# Patient Record
Sex: FEMALE | Race: BLACK OR AFRICAN AMERICAN | URBAN - METROPOLITAN AREA
[De-identification: names, ages, dates, MRNs, and addresses within clinical notes are randomized per-mention and may not be internally consistent; named-entity substitution may affect disease eponyms.]

---
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
## 2023-03-10 ENCOUNTER — HOSPITAL ENCOUNTER (EMERGENCY)
Age: 23
Discharge: HOME | End: 2023-03-10
Attending: EMERGENCY MEDICINE | Admitting: EMERGENCY MEDICINE
Payer: MEDICAID

## 2023-03-10 VITALS
BODY MASS INDEX: 40.48 KG/M2 | DIASTOLIC BLOOD PRESSURE: 74 MMHG | OXYGEN SATURATION: 99 % | RESPIRATION RATE: 18 BRPM | HEART RATE: 87 BPM | TEMPERATURE: 99.4 F | SYSTOLIC BLOOD PRESSURE: 124 MMHG | WEIGHT: 220 LBS | HEIGHT: 62 IN

## 2023-03-10 DIAGNOSIS — O20.9 VAGINAL BLEEDING IN PREGNANCY, FIRST TRIMESTER: Primary | ICD-10-CM

## 2023-03-10 LAB
APPEARANCE UR: ABNORMAL
BACTERIA URNS QL MICRO: ABNORMAL /HPF
BILIRUB UR QL: NEGATIVE
COLOR UR: YELLOW
EPITH CASTS URNS QL MICRO: ABNORMAL /LPF
GLUCOSE UR STRIP.AUTO-MCNC: NEGATIVE MG/DL
HCG UR QL: POSITIVE
HGB UR QL STRIP: ABNORMAL
KETONES UR QL STRIP.AUTO: NEGATIVE MG/DL
LEUKOCYTE ESTERASE UR QL STRIP.AUTO: ABNORMAL
NITRITE UR QL STRIP.AUTO: NEGATIVE
PH UR STRIP: 6 (ref 5–8)
PROT UR STRIP-MCNC: 30 MG/DL
RBC #/AREA URNS HPF: ABNORMAL /HPF (ref 0–5)
SP GR UR REFRACTOMETRY: 1.02 (ref 1–1.03)
UROBILINOGEN UR QL STRIP.AUTO: 1 EU/DL (ref 0.2–1)
WBC URNS QL MICRO: ABNORMAL /HPF (ref 0–4)

## 2023-03-10 PROCEDURE — 99283 EMERGENCY DEPT VISIT LOW MDM: CPT

## 2023-03-10 PROCEDURE — 81001 URINALYSIS AUTO W/SCOPE: CPT

## 2023-03-10 PROCEDURE — 81025 URINE PREGNANCY TEST: CPT

## 2023-03-11 ENCOUNTER — APPOINTMENT (OUTPATIENT)
Dept: ULTRASOUND IMAGING | Age: 23
End: 2023-03-11
Attending: NURSE PRACTITIONER
Payer: MEDICAID

## 2023-03-11 ENCOUNTER — HOSPITAL ENCOUNTER (EMERGENCY)
Age: 23
Discharge: HOME | End: 2023-03-11
Payer: MEDICAID

## 2023-03-11 VITALS
OXYGEN SATURATION: 98 % | BODY MASS INDEX: 40.48 KG/M2 | SYSTOLIC BLOOD PRESSURE: 121 MMHG | RESPIRATION RATE: 19 BRPM | HEART RATE: 72 BPM | DIASTOLIC BLOOD PRESSURE: 66 MMHG | WEIGHT: 220 LBS | HEIGHT: 62 IN | TEMPERATURE: 98.8 F

## 2023-03-11 DIAGNOSIS — O20.9 VAGINAL BLEEDING IN PREGNANCY, FIRST TRIMESTER: Primary | ICD-10-CM

## 2023-03-11 DIAGNOSIS — O20.0 THREATENED ABORTION: ICD-10-CM

## 2023-03-11 LAB
ABO + RH BLD: NORMAL
ANION GAP SERPL CALC-SCNC: 4 MMOL/L (ref 5–15)
BASOPHILS # BLD: 0.1 K/UL (ref 0–0.1)
BASOPHILS NFR BLD: 1 % (ref 0–1)
BLOOD GROUP ANTIBODIES SERPL: NEGATIVE
BUN SERPL-MCNC: 8 MG/DL (ref 6–20)
BUN/CREAT SERPL: 12 (ref 12–20)
CA-I BLD-MCNC: 9.4 MG/DL (ref 8.5–10.1)
CHLORIDE SERPL-SCNC: 110 MMOL/L (ref 97–108)
CO2 SERPL-SCNC: 23 MMOL/L (ref 21–32)
CREAT SERPL-MCNC: 0.69 MG/DL (ref 0.55–1.02)
DIFFERENTIAL METHOD BLD: ABNORMAL
EOSINOPHIL # BLD: 0.1 K/UL (ref 0–0.4)
EOSINOPHIL NFR BLD: 2 % (ref 0–7)
ERYTHROCYTE [DISTWIDTH] IN BLOOD BY AUTOMATED COUNT: 12.3 % (ref 11.5–14.5)
GLUCOSE SERPL-MCNC: 98 MG/DL (ref 65–100)
HCG SERPL-ACNC: 33 MIU/ML (ref 0–6)
HCT VFR BLD AUTO: 34.3 % (ref 35–47)
HGB BLD-MCNC: 11.8 G/DL (ref 11.5–16)
IMM GRANULOCYTES # BLD AUTO: 0 K/UL (ref 0–0.04)
IMM GRANULOCYTES NFR BLD AUTO: 0 % (ref 0–0.5)
LYMPHOCYTES # BLD: 1.7 K/UL (ref 0.8–3.5)
LYMPHOCYTES NFR BLD: 36 % (ref 12–49)
MCH RBC QN AUTO: 30.6 PG (ref 26–34)
MCHC RBC AUTO-ENTMCNC: 34.4 G/DL (ref 30–36.5)
MCV RBC AUTO: 88.9 FL (ref 80–99)
MONOCYTES # BLD: 0.3 K/UL (ref 0–1)
MONOCYTES NFR BLD: 7 % (ref 5–13)
NEUTS SEG # BLD: 2.5 K/UL (ref 1.8–8)
NEUTS SEG NFR BLD: 54 % (ref 32–75)
NRBC # BLD: 0 K/UL (ref 0–0.01)
NRBC BLD-RTO: 0 PER 100 WBC
PLATELET # BLD AUTO: 409 K/UL (ref 150–400)
PMV BLD AUTO: 9.3 FL (ref 8.9–12.9)
POTASSIUM SERPL-SCNC: 3.8 MMOL/L (ref 3.5–5.1)
RBC # BLD AUTO: 3.86 M/UL (ref 3.8–5.2)
SODIUM SERPL-SCNC: 137 MMOL/L (ref 136–145)
SPECIMEN EXP DATE BLD: NORMAL
WBC # BLD AUTO: 4.7 K/UL (ref 3.6–11)

## 2023-03-11 PROCEDURE — 84702 CHORIONIC GONADOTROPIN TEST: CPT

## 2023-03-11 PROCEDURE — 85025 COMPLETE CBC W/AUTO DIFF WBC: CPT

## 2023-03-11 PROCEDURE — 86900 BLOOD TYPING SEROLOGIC ABO: CPT

## 2023-03-11 PROCEDURE — 80048 BASIC METABOLIC PNL TOTAL CA: CPT

## 2023-03-11 PROCEDURE — 99284 EMERGENCY DEPT VISIT MOD MDM: CPT

## 2023-03-11 PROCEDURE — 76801 OB US < 14 WKS SINGLE FETUS: CPT

## 2023-03-11 PROCEDURE — 76817 TRANSVAGINAL US OBSTETRIC: CPT

## 2023-03-11 PROCEDURE — 36415 COLL VENOUS BLD VENIPUNCTURE: CPT

## 2023-03-11 NOTE — DISCHARGE INSTRUCTIONS
.. Thank you! Thank you for allowing me to care for you in the emergency department. It is my goal to provide you with excellent care. If you have not received excellent quality care, please ask to speak to the nurse manager. Please fill out the survey that will come to you by mail or email since we listen to your feedback! Below you will find a list of your tests from today's visit. Should you have any questions, please do not hesitate to call the emergency department. Labs  Recent Results (from the past 12 hour(s))   BETA HCG, QT    Collection Time: 03/11/23 11:16 AM   Result Value Ref Range    Beta HCG, QT 33 (H) 0 - 6 mIU/mL   CBC WITH AUTOMATED DIFF    Collection Time: 03/11/23 11:16 AM   Result Value Ref Range    WBC 4.7 3.6 - 11.0 K/uL    RBC 3.86 3.80 - 5.20 M/uL    HGB 11.8 11.5 - 16.0 g/dL    HCT 34.3 (L) 35.0 - 47.0 %    MCV 88.9 80.0 - 99.0 FL    MCH 30.6 26.0 - 34.0 PG    MCHC 34.4 30.0 - 36.5 g/dL    RDW 12.3 11.5 - 14.5 %    PLATELET 279 (H) 061 - 400 K/uL    MPV 9.3 8.9 - 12.9 FL    NRBC 0.0 0.0  WBC    ABSOLUTE NRBC 0.00 0.00 - 0.01 K/uL    NEUTROPHILS 54 32 - 75 %    LYMPHOCYTES 36 12 - 49 %    MONOCYTES 7 5 - 13 %    EOSINOPHILS 2 0 - 7 %    BASOPHILS 1 0 - 1 %    IMMATURE GRANULOCYTES 0 0 - 0.5 %    ABS. NEUTROPHILS 2.5 1.8 - 8.0 K/UL    ABS. LYMPHOCYTES 1.7 0.8 - 3.5 K/UL    ABS. MONOCYTES 0.3 0.0 - 1.0 K/UL    ABS. EOSINOPHILS 0.1 0.0 - 0.4 K/UL    ABS. BASOPHILS 0.1 0.0 - 0.1 K/UL    ABS. IMM.  GRANS. 0.0 0.00 - 0.04 K/UL    DF AUTOMATED     METABOLIC PANEL, BASIC    Collection Time: 03/11/23 11:16 AM   Result Value Ref Range    Sodium 137 136 - 145 mmol/L    Potassium 3.8 3.5 - 5.1 mmol/L    Chloride 110 (H) 97 - 108 mmol/L    CO2 23 21 - 32 mmol/L    Anion gap 4 (L) 5 - 15 mmol/L    Glucose 98 65 - 100 mg/dL    BUN 8 6 - 20 mg/dL    Creatinine 0.69 0.55 - 1.02 mg/dL    BUN/Creatinine ratio 12 12 - 20      eGFR >60 >60 ml/min/1.73m2    Calcium 9.4 8.5 - 10.1 mg/dL   TYPE & SCREEN    Collection Time: 03/11/23 11:16 AM   Result Value Ref Range    Crossmatch Expiration 03/14/2023,2356     ABO/Rh(D) A Positive     Antibody screen Negative        Radiologic Studies  US PREG UTS < 14 WKS SNGL   Final Result   No acute process. No intrauterine pregnancy shown. US UTS TRANSVAGINAL OB   Final Result   No acute process. No intrauterine pregnancy shown. CT Results  (Last 48 hours)      None          CXR Results  (Last 48 hours)      None          ------------------------------------------------------------------------------------------------------------  The exam and treatment you received in the Emergency Department were for an urgent problem and are not intended as complete care. It is important that you follow-up with a doctor, nurse practitioner, or physician assistant to:  (1) confirm your diagnosis,  (2) re-evaluation of changes in your illness and treatment, and  (3) for ongoing care. Please take your discharge instructions with you when you go to your follow-up appointment. If you have any problem arranging a follow-up appointment, contact the Emergency Department. If your symptoms become worse or you do not improve as expected and you are unable to reach your health care provider, please return to the Emergency Department. We are available 24 hours a day. If a prescription has been provided, please have it filled as soon as possible to prevent a delay in treatment. If you have any questions or reservations about taking the medication due to side effects or interactions with other medications, please call your primary care provider or contact the ER.

## 2023-03-11 NOTE — ED NOTES
Pt discharged home with self. Pt acting appropriately, respirations regular and unlabored, cap refill less than two seconds. Skin pink, dry and warm. Lungs clear bilaterally. No further complaints at this time. Patient verbalized understanding of discharge paperwork and has no further questions at this time. Education provided about continuation of care, follow up care and medication administration. Patient able to provided teach back about discharge instructions.

## 2023-03-11 NOTE — ED TRIAGE NOTES
Pt reports onset of vaginal bleeding x 2 days, seen for the same 1 day ago with positive urine HCG.  Pt denies abdominal pain or large amount of bleeding, pt denies clots

## 2023-03-11 NOTE — ED PROVIDER NOTES
[facility] EMERGENCY DEPT  EMERGENCY DEPARTMENT HISTORY AND PHYSICAL EXAM      Date: 3/11/2023  Patient Name: Roxy Sierra  MRN: [id number]  [place]: 2000  Date of evaluation: 3/11/2023  Provider: Monica Christian NP   Note Started: 11:10 AM 3/11/23    HISTORY OF PRESENT ILLNESS     Chief Complaint   Patient presents with    Vaginal Bleeding    Pregnancy Problem       History Provided By: Patient    HPI: Roxy Sierra is a 23 y.o. female presented to the ED complaining of vaginal bleeding for the past 2 days. Patient states her last menstrual period was 02/22/23. She denies cramping,abdominal pain, nausea, vomiting, or heavy bleeding. Patient states she notices the bleeding when she wipes after going to the bathroom. She has had 2 uncomplicated pregnancies prior. PAST MEDICAL HISTORY   Past Medical History:  No past medical history on file. Past Surgical History:  No past surgical history on file. Family History:  No family history on file. Social History:  Social History     Tobacco Use    Smoking status: Never    Smokeless tobacco: Never   Substance Use Topics    Drug use: Never       Allergies:  No Known Allergies    PCP: None    Current Meds:   There are no discharge medications for this patient. PHYSICAL EXAM     ED Triage Vitals   ED Encounter Vitals Group      BP 03/11/23 1049 121/66      Pulse (Heart Rate) 03/11/23 1049 72      Resp Rate 03/11/23 1049 19      Temp 03/11/23 1049 98.8 °F (37.1 °C)      Temp src --       O2 Sat (%) 03/11/23 1049 98 %      Weight 03/11/23 1050 220 lb      Height 03/11/23 1050 5' 2\"      Physical Exam  Constitutional:       General: She is not in acute distress. Appearance: Normal appearance. She is normal weight. She is not ill-appearing or diaphoretic. Cardiovascular:      Rate and Rhythm: Normal rate and regular rhythm. Pulses: Normal pulses. Heart sounds: Normal heart sounds.    Pulmonary:      Effort: Pulmonary effort is normal.      Breath sounds: Normal breath sounds. Abdominal:      Palpations: Abdomen is soft. Tenderness: There is no abdominal tenderness. There is no guarding or rebound. Genitourinary:     Comments: Vaginal bleeding  Skin:     General: Skin is warm and dry. Neurological:      Mental Status: She is alert. SCREENINGS        No data recorded      LAB, EKG AND DIAGNOSTIC RESULTS   Labs:  Recent Results (from the past 12 hour(s))   BETA HCG, QT    Collection Time: 03/11/23 11:16 AM   Result Value Ref Range    Beta HCG, QT 33 (H) 0 - 6 mIU/mL   CBC WITH AUTOMATED DIFF    Collection Time: 03/11/23 11:16 AM   Result Value Ref Range    WBC 4.7 3.6 - 11.0 K/uL    RBC 3.86 3.80 - 5.20 M/uL    HGB 11.8 11.5 - 16.0 g/dL    HCT 34.3 (L) 35.0 - 47.0 %    MCV 88.9 80.0 - 99.0 FL    MCH 30.6 26.0 - 34.0 PG    MCHC 34.4 30.0 - 36.5 g/dL    RDW 12.3 11.5 - 14.5 %    PLATELET 897 (H) 958 - 400 K/uL    MPV 9.3 8.9 - 12.9 FL    NRBC 0.0 0.0  WBC    ABSOLUTE NRBC 0.00 0.00 - 0.01 K/uL    NEUTROPHILS 54 32 - 75 %    LYMPHOCYTES 36 12 - 49 %    MONOCYTES 7 5 - 13 %    EOSINOPHILS 2 0 - 7 %    BASOPHILS 1 0 - 1 %    IMMATURE GRANULOCYTES 0 0 - 0.5 %    ABS. NEUTROPHILS 2.5 1.8 - 8.0 K/UL    ABS. LYMPHOCYTES 1.7 0.8 - 3.5 K/UL    ABS. MONOCYTES 0.3 0.0 - 1.0 K/UL    ABS. EOSINOPHILS 0.1 0.0 - 0.4 K/UL    ABS. BASOPHILS 0.1 0.0 - 0.1 K/UL    ABS. IMM.  GRANS. 0.0 0.00 - 0.04 K/UL    DF AUTOMATED     METABOLIC PANEL, BASIC    Collection Time: 03/11/23 11:16 AM   Result Value Ref Range    Sodium 137 136 - 145 mmol/L    Potassium 3.8 3.5 - 5.1 mmol/L    Chloride 110 (H) 97 - 108 mmol/L    CO2 23 21 - 32 mmol/L    Anion gap 4 (L) 5 - 15 mmol/L    Glucose 98 65 - 100 mg/dL    BUN 8 6 - 20 mg/dL    Creatinine 0.69 0.55 - 1.02 mg/dL    BUN/Creatinine ratio 12 12 - 20      eGFR >60 >60 ml/min/1.73m2    Calcium 9.4 8.5 - 10.1 mg/dL   TYPE & SCREEN    Collection Time: 03/11/23 11:16 AM   Result Value Ref Range    Crossmatch Expiration 03/14/2023,2359     ABO/Rh(D) A Positive     Antibody screen Negative        EKG:  Not Applicable    Radiologic Studies:   Not applicable    Interpretation per the Radiologist below, if available at the time of this note:  US UTS TRANSVAGINAL OB    Result Date: 3/11/2023  TRANSABDOMINAL AND TRANSVAGINAL PELVIC ULTRASOUND WITH PELVIC DOPPLER. 3/11/2023 2:50 PM INDICATION: Bleeding, pregnancy. COMPARISON: None. TECHNIQUE: Grayscale, color, and Doppler ultrasound imaging of the pelvis was performed both transabdominally and transvaginally. FINDINGS: Transabdominally, the uterus measures 86 x 40 x 61 mm and shows no focal myometrial abnormality. The endometrium is obscured by the angle of the uterus. The right ovary measures 30 x 18 x 13 mm and the left ovary measures 30 x 13 x 23 mm. Transvaginally, the uterus measures 86 x 47 x 46 mm and shows no focal myometrial abnormality. The endometrium measures 4 mm and no intrauterine pregnancy is shown. The ovaries are normal in size and echotexture. The bilateral normal Doppler flow does not exclude torsion, however, the likelihood is very low given the greyscale appearance of the ovaries. The right ovary measures 28 x 15 x 17 mm and the left ovary measures 26 x 18 x 18 mm. There is trace free pelvic fluid. No acute process. No intrauterine pregnancy shown. US PREG UTS < 14 WKS SNGL    Result Date: 3/11/2023  TRANSABDOMINAL AND TRANSVAGINAL PELVIC ULTRASOUND WITH PELVIC DOPPLER. 3/11/2023 2:50 PM INDICATION: Bleeding, pregnancy. COMPARISON: None. TECHNIQUE: Grayscale, color, and Doppler ultrasound imaging of the pelvis was performed both transabdominally and transvaginally. FINDINGS: Transabdominally, the uterus measures 86 x 40 x 61 mm and shows no focal myometrial abnormality. The endometrium is obscured by the angle of the uterus. The right ovary measures 30 x 18 x 13 mm and the left ovary measures 30 x 13 x 23 mm.  Transvaginally, the uterus measures 86 x 47 x 46 mm and shows no focal myometrial abnormality. The endometrium measures 4 mm and no intrauterine pregnancy is shown. The ovaries are normal in size and echotexture. The bilateral normal Doppler flow does not exclude torsion, however, the likelihood is very low given the greyscale appearance of the ovaries. The right ovary measures 28 x 15 x 17 mm and the left ovary measures 26 x 18 x 18 mm. There is trace free pelvic fluid. No acute process. No intrauterine pregnancy shown. PROCEDURES   Unless otherwise noted below, none. Procedures      CRITICAL CARE TIME   None    ED COURSE and DIFFERENTIAL DIAGNOSIS/MDM   CC/HPI Summary, DDx, ED Course, and Reassessment:   Pt presents with vaginal bleeding during pregnancy. DDx; ectopic, molar pregnancy, physiologic bleeding, threatened . Will get beta hcg, T+S for Rh status, and Transvaginal US to further evaluate. Pt has been re-examined and states that they are feeling better and have no new complaints. Laboratory tests, medications, x-rays, diagnosis, follow up plan and return instructions have been reviewed and discussed with the patient and/or family. Pt and/or family have had the opportunity to ask questions about their care. Patient and/or family express understanding and agreement with care plan, follow up and return instructions. Patent and/or family agree to call an OB/GYN as soon as possible for a repeat HCG test in 48 hours. Patient and family understand that they could still have a miscarriage even with POC seen in the uterus and that a heterotopic pregnancy is still a very small possibility. The patient and family understand that OB/GYN follow up is necessary to evaluate these conditions.       Records Reviewed (source and summary of external notes): Prior medical records and Nursing notes    Vitals:    Vitals:    23 1049 23 1050   BP: 121/66    Pulse: 72    Resp: 19    Temp: 98.8 °F (37.1 °C)    SpO2: 98%    Weight:  99.8 kg (220 lb)   Height:  5' 2\" (1.575 m)             Patient was given the following medications:  Medications - No data to display    CONSULTS: (Who and What was discussed)  None     Social Determinants affecting Dx or Tx: None    FINAL IMPRESSION     1. Vaginal bleeding in pregnancy, first trimester    2. Threatened           DISPOSITION/PLAN   Discharged    Discharge Note: The patient is stable for discharge home. The signs, symptoms, diagnosis, and discharge instructions have been discussed, understanding conveyed, and agreed upon. The patient is to follow up as recommended or return to ER should their symptoms worsen. PATIENT REFERRED TO:  Follow-up Information       Follow up With Specialties Details Why [address] EMERGENCY DEPT Emergency Medicine  If symptoms worsen [facility] [address]  [phone]              DISCHARGE MEDICATIONS:  There are no discharge medications for this patient. DISCONTINUED MEDICATIONS:  There are no discharge medications for this patient. I am the Primary Clinician of Record: Che Gutierrez NP (electronically signed)    (Please note that parts of this dictation were completed with voice recognition software. Quite often unanticipated grammatical, syntax, homophones, and other interpretive errors are inadvertently transcribed by the computer software. Please disregards these errors.  Please excuse any errors that have escaped final proofreading.)

## 2023-03-11 NOTE — ED PROVIDER NOTES
EMERGENCY DEPARTMENT HISTORY AND PHYSICAL EXAM      Date: 3/10/2023  Patient Name: Nathan Pagan    History of Presenting Illness     Chief Complaint   Patient presents with    Threatened Miscarriage       History Provided By: Patient    HPI: Nathan Pagan, 23 y.o. female   presents to the ED with cc of vaginal bleeding. Patient complains of seeing tinge of blood when she wiped after urination just prior to arrival.  Patient states that she was tested positive for pregnancy at home. Last menstrual period was February 22. No passing blood clots. No dysuria or hematuria. No abdominal pain or flank pain. No nausea vomiting or diarrhea. No signs of GI bleeding. Patient is G2, P2.      PCP: None    No current facility-administered medications on file prior to encounter. No current outpatient medications on file prior to encounter. Past History     Past Medical History:  No past medical history on file. Past Surgical History:  No past surgical history on file. Family History:  No family history on file. Social History:  Social History     Tobacco Use    Smoking status: Never    Smokeless tobacco: Never   Substance Use Topics    Drug use: Never       Allergies:  No Known Allergies      Review of Systems       Physical Exam   Physical Exam  Vitals and nursing note reviewed. Constitutional:       General: She is not in acute distress. Appearance: Normal appearance. She is well-developed. She is not ill-appearing or toxic-appearing. HENT:      Head: Normocephalic and atraumatic. Nose: No congestion. Mouth/Throat:      Mouth: Mucous membranes are moist.   Eyes:      Conjunctiva/sclera: Conjunctivae normal.   Cardiovascular:      Rate and Rhythm: Regular rhythm. Heart sounds: Normal heart sounds. Pulmonary:      Effort: Pulmonary effort is normal.      Breath sounds: Normal breath sounds. Abdominal:      General: Bowel sounds are normal.      Palpations: Abdomen is soft. Tenderness: There is no guarding or rebound. Genitourinary:     Comments: No active vaginal bleeding as per patient. Musculoskeletal:         General: No deformity. Cervical back: Neck supple. Skin:     General: Skin is warm and dry. Neurological:      General: No focal deficit present. Mental Status: She is alert. Psychiatric:         Mood and Affect: Mood normal.       Diagnostic Study Results     Labs -     Recent Results (from the past 12 hour(s))   URINALYSIS W/ RFLX MICROSCOPIC    Collection Time: 03/10/23  7:30 PM   Result Value Ref Range    Color Yellow      Appearance Hazy (A) Clear      Specific gravity 1.020 1.003 - 1.030      pH (UA) 6.0 5.0 - 8.0      Protein 30 (A) Negative mg/dL    Glucose Negative Negative mg/dL    Ketone Negative Negative mg/dL    Bilirubin Negative Negative      Blood Large (A) Negative      Urobilinogen 1.0 0.2 - 1.0 EU/dL    Nitrites Negative Negative      Leukocyte Esterase Small (A) Negative     URINE MICROSCOPIC    Collection Time: 03/10/23  7:30 PM   Result Value Ref Range    WBC 5-10 0 - 4 /hpf    RBC 5-10 0 - 5 /hpf    Epithelial cells Few Few /lpf    Bacteria 1+ (A) Negative /hpf   POC HCG,URINE    Collection Time: 03/10/23  7:37 PM   Result Value Ref Range    Pregnancy test,urine (POC) Positive (A) Negative         Radiologic Studies -   No orders to display     CT Results  (Last 48 hours)      None          CXR Results  (Last 48 hours)      None              Medical Decision Making   I am the first provider for this patient. I reviewed the vital signs, available nursing notes, past medical history, past surgical history, family history and social history. Vital Signs-Reviewed the patient's vital signs.   Patient Vitals for the past 12 hrs:   Temp Pulse Resp BP SpO2   03/10/23 1901 99.4 °F (37.4 °C) 87 18 124/74 99 %       Records Reviewed:     Provider Notes (Medical Decision Making):   Patient was G2, P2 with a last menstrual period about 3 weeks ago present with seeing tinge of blood on wipes after urination just prior to arrival.  No abdominal pain. Patient is nontoxic-appearing in no acute distress. Ectopic pregnancy very unlikely. Vaginal spotting may represent normal part of early pregnancy versus threatened miscarriage. I discussed with the patient regarding the implication of the episode of bleeding. Due to lack of severe bleeding and 3 weeks gestation based on LMP, emergent ultrasound would not be helpful. Patient was discharged with instruction to follow-up with OB as planned in 3 weeks and return to emergency room for abdominal pain or worsening vaginal bleeding. ED Course:   Initial assessment performed. The patients presenting problems have been discussed, and they are in agreement with the care plan formulated and outlined with them. I have encouraged them to ask questions as they arise throughout their visit. No active bleeding. PROCEDURES      Disposition: Condition stable   DC- Adult Discharges: All of the diagnostic tests were reviewed and questions answered. Diagnosis, care plan and treatment options were discussed. understand instructions and will follow up as directed. The patients results have been reviewed with them. They have been counseled regarding their diagnosis. The patient verbally convey understanding and agreement of the signs, symptoms, diagnosis, treatment and prognosis and additionally agrees to follow up as recommended. They also agree with the care-plan and convey that all of their questions have been answered. I have also put together some discharge instructions for them that include: 1) educational information regarding their diagnosis, 2) how to care for their diagnosis at home, as well a 3) list of reasons why they would want to return to the ED prior to their follow-up appointment, should their condition change. PLAN:  1. There are no discharge medications for this patient.     2. Follow-up Information       Follow up With Specialties Details Why Contact Info    Follow up with your GYN/OB doctor in 1-3 days              Return to ED if worse     Diagnosis     Clinical Impression:   1. Vaginal bleeding in pregnancy, first trimester        Please note that this dictation was completed with [employer], the computer voice recognition software. Quite often unanticipated grammatical, syntax, homophones, and other interpretive errors are inadvertently transcribed by the computer software. Please disregard these errors. Please excuse any errors that have escaped final proofreading. Thank you.

## 2023-03-11 NOTE — ED TRIAGE NOTES
LMP . . Reports positive home preg test on . States she had some spotting -. States tonight she felt some abd cramping and then noticed pink blood when wiping. Arrives to triage ambulatory with EMS, tearful.

## 2023-03-11 NOTE — ED NOTES
Pt attempted to leave with IV in. Told pt she couldn't leave with an IV and that she is still waiting on an [address]. Pt does not want to wait for US. IV removed.

## 2025-06-19 ENCOUNTER — OFFICE VISIT (OUTPATIENT)
Facility: CLINIC | Age: 25
End: 2025-06-19
Payer: MEDICAID

## 2025-06-19 VITALS
WEIGHT: 233 LBS | DIASTOLIC BLOOD PRESSURE: 80 MMHG | HEART RATE: 84 BPM | OXYGEN SATURATION: 100 % | HEIGHT: 62 IN | BODY MASS INDEX: 42.88 KG/M2 | SYSTOLIC BLOOD PRESSURE: 122 MMHG

## 2025-06-19 DIAGNOSIS — Z83.3 FAMILY HISTORY OF DIABETES MELLITUS: ICD-10-CM

## 2025-06-19 DIAGNOSIS — D64.9 MILD ANEMIA: ICD-10-CM

## 2025-06-19 DIAGNOSIS — Z00.01 ENCOUNTER FOR ANNUAL GENERAL MEDICAL EXAMINATION WITH ABNORMAL FINDINGS IN ADULT: ICD-10-CM

## 2025-06-19 DIAGNOSIS — Z00.01 ENCOUNTER FOR ANNUAL GENERAL MEDICAL EXAMINATION WITH ABNORMAL FINDINGS IN ADULT: Primary | ICD-10-CM

## 2025-06-19 DIAGNOSIS — E66.01 MORBID OBESITY WITH BMI OF 40.0-44.9, ADULT (HCC): ICD-10-CM

## 2025-06-19 PROCEDURE — 99385 PREV VISIT NEW AGE 18-39: CPT | Performed by: INTERNAL MEDICINE

## 2025-06-19 RX ORDER — LEVONORGESTREL 52 MG/1
1 INTRAUTERINE DEVICE INTRAUTERINE ONCE
COMMUNITY

## 2025-06-19 SDOH — ECONOMIC STABILITY: FOOD INSECURITY: WITHIN THE PAST 12 MONTHS, YOU WORRIED THAT YOUR FOOD WOULD RUN OUT BEFORE YOU GOT MONEY TO BUY MORE.: NEVER TRUE

## 2025-06-19 SDOH — ECONOMIC STABILITY: FOOD INSECURITY: WITHIN THE PAST 12 MONTHS, THE FOOD YOU BOUGHT JUST DIDN'T LAST AND YOU DIDN'T HAVE MONEY TO GET MORE.: NEVER TRUE

## 2025-06-19 ASSESSMENT — PATIENT HEALTH QUESTIONNAIRE - PHQ9
2. FEELING DOWN, DEPRESSED OR HOPELESS: NOT AT ALL
SUM OF ALL RESPONSES TO PHQ QUESTIONS 1-9: 0
1. LITTLE INTEREST OR PLEASURE IN DOING THINGS: NOT AT ALL
SUM OF ALL RESPONSES TO PHQ QUESTIONS 1-9: 0

## 2025-06-19 ASSESSMENT — ENCOUNTER SYMPTOMS
NAUSEA: 0
COUGH: 0
SHORTNESS OF BREATH: 0
VOMITING: 0
ABDOMINAL PAIN: 0
DIARRHEA: 0

## 2025-06-19 NOTE — PROGRESS NOTES
[facility]  [address], [place] [address]  Phone: [phone]      Bayron Coello (: 2000) is a 24 y.o. female, established patient, here for evaluation of the following chief complaint(s):  New Patient (Establish Care with Dr. Victor. Former patient of .)     SUBJECTIVE/OBJECTIVE:  History of Present Illness  The patient is a 24-year-old female with no significant past medical history, seen as a new patient to establish care with Dr. Victor.    She has not undergone a health checkup in a considerable period. Her current medication regimen includes prenatal vitamins. She underwent a  on 10/03/2024 at [facility] and has an intrauterine device (IUD) in place. Her obstetric history includes 2 previous pregnancies resulting in live births, a 4-year-old son and a 3-year-old daughter, both delivered vaginally. She has also experienced 2 miscarriages. Her gynecologist is Diony Madrid. She is uncertain about the date of her last Pap smear. She reports recent weight gain, which she attributes to her pregnancies. She consumes soda and is making efforts to reduce her intake. She reports no epistaxis or oral bleeding. She also reports no urinary or bowel difficulties, including constipation.    PAST SURGICAL HISTORY: She underwent a  on 10/03/2024.    FAMILY HISTORY  - Great grandmother had diabetes  - Maternal grandmother has sickle cell anemia      Results  Labs   - Hemoglobin: 10/04/2024, 9.7   - Hemoglobin: 2024, 11.3   - Syphilis: Nonreactive   - HIV: Nonreactive     No results found for: \"LABA1C\"   Hemoglobin   Date Value Ref Range Status   2023 11.8 11.5 - 16.0 g/dL Final     Hematocrit   Date Value Ref Range Status   2023 34.3 (L) 35.0 - 47.0 % Final     Creatinine   Date Value Ref Range Status   2023 0.69 0.55 - 1.02 mg/dL Final     Glucose   Date Value Ref Range Status   2023 98 65 - 100 mg/dL Final

## 2025-06-19 NOTE — PROGRESS NOTES
.  Chief Complaint   Patient presents with    New Patient     Establish Care with Dr. Victor. Former patient of .   .  Have you been to the ER, urgent care clinic since your last visit?  Hospitalized since your last visit?   NO    Have you seen or consulted any other health care providers outside our system since your last visit?   YES - When: approximately 7 months ago.  Where and Why: Diony Madrid NP for IUD placement.     “Have you had a pap smear?”    YES - Where: [facility]y Madrid NP Nurse/CMA to request most recent records if not in the chart    No cervical cancer screening on file     ./80 (BP Site: Right Upper Arm, Patient Position: Sitting, BP Cuff Size: Medium Adult)   Pulse 84   Ht 1.575 m (5' 2\")   Wt 105.7 kg (233 lb)   SpO2 100%   BMI 42.62 kg/m²

## 2025-06-24 LAB
ALBUMIN SERPL-MCNC: 4.6 G/DL (ref 4–5)
ALP SERPL-CCNC: 98 IU/L (ref 44–121)
ALT SERPL-CCNC: 21 IU/L (ref 0–32)
APPEARANCE UR: CLEAR
AST SERPL-CCNC: 17 IU/L (ref 0–40)
BASOPHILS # BLD AUTO: 0 X10E3/UL (ref 0–0.2)
BASOPHILS NFR BLD AUTO: 1 %
BILIRUB SERPL-MCNC: 0.3 MG/DL (ref 0–1.2)
BILIRUB UR QL STRIP: NEGATIVE
BUN SERPL-MCNC: 12 MG/DL (ref 6–20)
BUN/CREAT SERPL: 21 (ref 9–23)
CALCIUM SERPL-MCNC: 9.8 MG/DL (ref 8.7–10.2)
CHLORIDE SERPL-SCNC: 106 MMOL/L (ref 96–106)
CHOLEST SERPL-MCNC: 158 MG/DL (ref 100–199)
CO2 SERPL-SCNC: 18 MMOL/L (ref 20–29)
COLOR UR: YELLOW
CREAT SERPL-MCNC: 0.58 MG/DL (ref 0.57–1)
EGFRCR SERPLBLD CKD-EPI 2021: 131 ML/MIN/1.73
EOSINOPHIL # BLD AUTO: 0.1 X10E3/UL (ref 0–0.4)
EOSINOPHIL NFR BLD AUTO: 2 %
ERYTHROCYTE [DISTWIDTH] IN BLOOD BY AUTOMATED COUNT: 12.5 % (ref 11.7–15.4)
FERRITIN SERPL-MCNC: 136 NG/ML (ref 15–150)
GLOBULIN SER CALC-MCNC: 2.8 G/DL (ref 1.5–4.5)
GLUCOSE SERPL-MCNC: 77 MG/DL (ref 70–99)
GLUCOSE UR QL STRIP: NEGATIVE
HBA1C MFR BLD: 5 % (ref 4.8–5.6)
HCT VFR BLD AUTO: 36.7 % (ref 34–46.6)
HDLC SERPL-MCNC: 37 MG/DL
HGB BLD-MCNC: 12 G/DL (ref 11.1–15.9)
HGB UR QL STRIP: NEGATIVE
IMM GRANULOCYTES # BLD AUTO: 0 X10E3/UL (ref 0–0.1)
IMM GRANULOCYTES NFR BLD AUTO: 0 %
IRON SATN MFR SERPL: 24 % (ref 15–55)
IRON SERPL-MCNC: 71 UG/DL (ref 27–159)
KETONES UR QL STRIP: NEGATIVE
LDLC SERPL CALC-MCNC: 107 MG/DL (ref 0–99)
LEUKOCYTE ESTERASE UR QL STRIP: ABNORMAL
LYMPHOCYTES # BLD AUTO: 2 X10E3/UL (ref 0.7–3.1)
LYMPHOCYTES NFR BLD AUTO: 28 %
MCH RBC QN AUTO: 30.5 PG (ref 26.6–33)
MCHC RBC AUTO-ENTMCNC: 32.7 G/DL (ref 31.5–35.7)
MCV RBC AUTO: 93 FL (ref 79–97)
MONOCYTES # BLD AUTO: 0.4 X10E3/UL (ref 0.1–0.9)
MONOCYTES NFR BLD AUTO: 6 %
NEUTROPHILS # BLD AUTO: 4.6 X10E3/UL (ref 1.4–7)
NEUTROPHILS NFR BLD AUTO: 63 %
NITRITE UR QL STRIP: NEGATIVE
PH UR STRIP: 5.5 [PH] (ref 5–7.5)
PLATELET # BLD AUTO: 385 X10E3/UL (ref 150–450)
POTASSIUM SERPL-SCNC: 4.4 MMOL/L (ref 3.5–5.2)
PROT SERPL-MCNC: 7.4 G/DL (ref 6–8.5)
PROT UR QL STRIP: NEGATIVE
RBC # BLD AUTO: 3.93 X10E6/UL (ref 3.77–5.28)
SODIUM SERPL-SCNC: 139 MMOL/L (ref 134–144)
SP GR UR STRIP: 1.02 (ref 1–1.03)
TIBC SERPL-MCNC: 299 UG/DL (ref 250–450)
TRIGL SERPL-MCNC: 73 MG/DL (ref 0–149)
TSH SERPL DL<=0.005 MIU/L-ACNC: 1.27 UIU/ML (ref 0.45–4.5)
UIBC SERPL-MCNC: 228 UG/DL (ref 131–425)
UROBILINOGEN UR STRIP-MCNC: 0.2 MG/DL (ref 0.2–1)
VLDLC SERPL CALC-MCNC: 14 MG/DL (ref 5–40)
WBC # BLD AUTO: 7.3 X10E3/UL (ref 3.4–10.8)

## 2025-07-06 ENCOUNTER — RESULTS FOLLOW-UP (OUTPATIENT)
Facility: CLINIC | Age: 25
End: 2025-07-06